# Patient Record
Sex: FEMALE
[De-identification: names, ages, dates, MRNs, and addresses within clinical notes are randomized per-mention and may not be internally consistent; named-entity substitution may affect disease eponyms.]

---

## 2017-11-16 ENCOUNTER — HOSPITAL ENCOUNTER (EMERGENCY)
Dept: HOSPITAL 14 - H.ER | Age: 67
Discharge: HOME | End: 2017-11-16
Payer: MEDICAID

## 2017-11-16 VITALS
RESPIRATION RATE: 18 BRPM | SYSTOLIC BLOOD PRESSURE: 154 MMHG | TEMPERATURE: 98.2 F | DIASTOLIC BLOOD PRESSURE: 76 MMHG | OXYGEN SATURATION: 97 % | HEART RATE: 99 BPM

## 2017-11-16 VITALS — BODY MASS INDEX: 27.3 KG/M2

## 2017-11-16 DIAGNOSIS — M81.0: ICD-10-CM

## 2017-11-16 DIAGNOSIS — E78.00: ICD-10-CM

## 2017-11-16 DIAGNOSIS — F32.9: ICD-10-CM

## 2017-11-16 DIAGNOSIS — M54.9: Primary | ICD-10-CM

## 2017-11-16 DIAGNOSIS — I10: ICD-10-CM

## 2017-11-16 DIAGNOSIS — F41.9: ICD-10-CM

## 2017-11-16 PROCEDURE — 72131 CT LUMBAR SPINE W/O DYE: CPT

## 2017-11-16 PROCEDURE — 99282 EMERGENCY DEPT VISIT SF MDM: CPT

## 2017-11-16 PROCEDURE — 96372 THER/PROPH/DIAG INJ SC/IM: CPT

## 2017-11-16 NOTE — CT
EXAM:

  CT Lumbar Spine Without Intravenous Contrast



CLINICAL HISTORY:

  67 years old, female; Pain; Other: Midline pain; Additional info: Lumbar 

midline pain positional HX of arthritis



TECHNIQUE:

  Axial computed tomography images of the lumbar spine without intravenous 

contrast.  All CT scans at this facility use one or more dose reduction 

techniques, viz.: automated exposure control; ma/kV adjustment per patient size 

(including targeted exams where dose is matched to indication; i.e. head); or 

iterative reconstruction technique.



COMPARISON:

  No relevant prior studies available.



FINDINGS:

  Vertebrae:  No acute fracture.

  Discs/spinal canal/neural foramina:  No acute findings. Degenerative disease 

is identified at the level of L5/S1 with disc space narrowing, endplate changes 

and a vacuum phenomena. Facet arthropathy is also noted.

 Soft tissues:  Unremarkable.



IMPRESSION:     

Degenerative disease at the level of L5/S1, as detailed above.

## 2017-11-16 NOTE — ED PDOC
HPI: Back


Time Seen by Provider: 11/16/17 22:06


Chief Complaint (Nursing): Back Pain


Chief Complaint (Provider): Back Pain


History Per: Patient


History/Exam Limitations: no limitations


Onset/Duration Of Symptoms: Days (x 2), Intermittent Episodes


Additional Complaint(s): 





oRnit is a 68 y/o female who presents to the ED c/o back pain that has been 

intermittent for the past two days. Patient states that the pain is worse when 

walking, bending over, or sitting up, and better when lying still. She denies 

radiation to the legs, numbness, weakness, fever, or urinary symptoms, and does 

not have problems ambulating.





PMD: Kylee Rivas





Past Medical History


Reviewed: Historical Data, Nursing Documentation, Vital Signs


Vital Signs: 


 Last Vital Signs











Temp  98.2 F   11/16/17 22:01


 


Pulse  99 H  11/16/17 22:01


 


Resp  18   11/16/17 22:01


 


BP  154/76 H  11/16/17 22:01


 


Pulse Ox  97   11/16/17 22:01














- Medical History


PMH: Anxiety, Colonic Polyps, Depression, Gastritis, HTN, Hypercholesterolemia, 

Osteoporosis


   Denies: Malignancy, Chronic Kidney Disease





- Surgical History


Surgical History: Endoscopy





- Family History


Family History: States: Unknown Family Hx





- Social History


Drugs: Denies





- Immunization History


Hx Tetanus Toxoid Vaccination: No


Hx Influenza Vaccination: Yes


Hx Pneumococcal Vaccination: No





- Home Medications


Home Medications: 


 Ambulatory Orders











 Medication  Instructions  Recorded


 


Amlodipine Besylate [Amlodipine] 5 mg PO DAILY 05/09/14


 


Atorvastatin Calcium [Lipitor] 10 mg PO DAILY 05/09/14


 


Raloxifene HCl [Evista] 60 mg PO DAILY 02/15/16


 


Ibuprofen [Motrin] 600 mg PO TID #20 tab 08/30/16


 


Methocarbamol [Robaxin] 500 mg PO BID #8 tab 08/30/16


 


Naproxen 500 mg PO BID #30 tab 11/16/17














- Allergies


Allergies/Adverse Reactions: 


 Allergies











Allergy/AdvReac Type Severity Reaction Status Date / Time


 


No Known Allergies Allergy   Verified 08/30/16 14:06














Review of Systems


ROS Statement: Except As Marked, All Systems Reviewed And Found Negative


Constitutional: Negative for: Fever, Weakness


Genitourinary Female: Negative for: Dysuria, Frequency, Incontinence, Hematuria


Musculoskeletal: Positive for: Back Pain.  Negative for: Leg Pain





Physical Exam





- Reviewed


Nursing Documentation Reviewed: Yes


Vital Signs Reviewed: Yes





- Physical Exam


Appears: Positive for: Well, Non-toxic, No Acute Distress


Head Exam: Positive for: ATRAUMATIC, NORMAL INSPECTION, NORMOCEPHALIC


Skin: Positive for: Normal Color, Warm, Dry


ENT: Positive for: Normal ENT Inspection


Neck: Positive for: Normal, Painless ROM, Supple


Cardiovascular/Chest: Positive for: Regular Rate, Rhythm.  Negative for: Murmur


Respiratory: Positive for: Normal Breath Sounds.  Negative for: Respiratory 

Distress


Gastrointestinal/Abdominal: Positive for: Normal Exam, Bowel Sounds, Soft.  

Negative for: Tenderness


Back: Positive for: Other (lower back midline and paraspinal tenderness. 

Negative straight leg)


Extremity: Positive for: Normal ROM.  Negative for: Pedal Edema, Deformity


Neurologic/Psych: Positive for: Alert, Oriented.  Negative for: Motor/Sensory 

Deficits





- ECG


O2 Sat by Pulse Oximetry: 97 (RA)


Pulse Ox Interpretation: Normal





Medical Decision Making


Medical Decision Making: 





Time: 22:15


Initial Impression: Back Pain - Differentials: arthritis of spine, 

musculoskeletal pain, other conditions considered but not listed


Initial Plan:


--CT Lumbar Spine w/o Contrast


--Urine Dipstick


--Toradol


--Flexeril





Time: 23:30


FINDINGS:  


   Vertebrae:  No acute fracture.  


   Discs/spinal canal/neural foramina:  No acute findings. Degenerative disease

   


 is identified at the level of L5/S1 with disc space narrowing, endplate 

changes   


 and a vacuum phenomena. Facet arthropathy is also noted.  


  Soft tissues:  Unremarkable.  


 


 IMPRESSION:       


 Degenerative disease at the level of L5/S1, as detailed above.  





--------------------------------------------------------------------------------

-----------------


Scribe Attestation:


Documented by Zane Saha, acting as a scribe for Dalila Zapien MD





Provider Scribe Attestation:


All medical record entries made by the Scribe were at my direction and 

personally dictated by me. I have reviewed the chart and agree that the record 

accurately reflects my personal performance of the history, physical exam, 

medical decision making, and the department course for this patient. I have 

also personally directed, reviewed, and agree with the discharge instructions 

and disposition.





Disposition





- Clinical Impression


Clinical Impression: 


 Back pain, Arthritis of lumbar spine








- Patient ED Disposition


Is Patient to be Admitted: No


Doctor Will See Patient In The: Office


Counseled Patient/Family Regarding: Studies Performed, Diagnosis, Need For 

Followup





- Disposition


Referrals: 


[facility] [Outside]


Disposition: Routine/Home


Disposition Time: 23:43


Condition: GOOD


Additional Instructions: 


Take your medications as instructed. Follow up with your PCP in 2-3 days. 


Prescriptions: 


Naproxen 500 mg PO BID #30 tab


Instructions:  Back Pain (ED)


Print Language: Maori

## 2018-01-22 ENCOUNTER — HOSPITAL ENCOUNTER (EMERGENCY)
Dept: HOSPITAL 14 - H.ER | Age: 68
Discharge: HOME | End: 2018-01-22
Payer: MEDICAID

## 2018-01-22 VITALS — TEMPERATURE: 97 F | OXYGEN SATURATION: 99 % | RESPIRATION RATE: 17 BRPM

## 2018-01-22 VITALS — SYSTOLIC BLOOD PRESSURE: 120 MMHG | DIASTOLIC BLOOD PRESSURE: 88 MMHG | HEART RATE: 76 BPM

## 2018-01-22 VITALS — BODY MASS INDEX: 27.3 KG/M2

## 2018-01-22 DIAGNOSIS — F41.9: ICD-10-CM

## 2018-01-22 DIAGNOSIS — F32.9: ICD-10-CM

## 2018-01-22 DIAGNOSIS — R73.09: ICD-10-CM

## 2018-01-22 DIAGNOSIS — E78.00: ICD-10-CM

## 2018-01-22 DIAGNOSIS — I10: ICD-10-CM

## 2018-01-22 DIAGNOSIS — M54.9: Primary | ICD-10-CM

## 2018-01-22 DIAGNOSIS — N95.0: ICD-10-CM

## 2018-01-22 DIAGNOSIS — M81.0: ICD-10-CM

## 2018-01-22 LAB
ALBUMIN SERPL-MCNC: 4.5 G/DL (ref 3.5–5)
ALBUMIN/GLOB SERPL: 1.4 {RATIO} (ref 1–2.1)
ALT SERPL-CCNC: 44 U/L (ref 9–52)
AST SERPL-CCNC: 32 U/L (ref 14–36)
BASOPHILS # BLD AUTO: 0.1 K/UL (ref 0–0.2)
BASOPHILS NFR BLD: 1 % (ref 0–2)
BILIRUB UR-MCNC: NEGATIVE MG/DL
BUN SERPL-MCNC: 14 MG/DL (ref 7–17)
CALCIUM SERPL-MCNC: 9.9 MG/DL (ref 8.4–10.2)
COLOR UR: YELLOW
EOSINOPHIL # BLD AUTO: 0 K/UL (ref 0–0.7)
EOSINOPHIL NFR BLD: 0.5 % (ref 0–4)
ERYTHROCYTE [DISTWIDTH] IN BLOOD BY AUTOMATED COUNT: 13.3 % (ref 11.5–14.5)
GFR NON-AFRICAN AMERICAN: > 60
GLUCOSE UR STRIP-MCNC: (no result) MG/DL
HGB BLD-MCNC: 13.7 G/DL (ref 12–16)
LEUKOCYTE ESTERASE UR-ACNC: (no result) LEU/UL
LYMPHOCYTES # BLD AUTO: 2.9 K/UL (ref 1–4.3)
LYMPHOCYTES NFR BLD AUTO: 37 % (ref 20–40)
MCH RBC QN AUTO: 28.7 PG (ref 27–31)
MCHC RBC AUTO-ENTMCNC: 33.3 G/DL (ref 33–37)
MCV RBC AUTO: 86.4 FL (ref 81–99)
MONOCYTES # BLD: 0.5 K/UL (ref 0–0.8)
MONOCYTES NFR BLD: 6.2 % (ref 0–10)
NEUTROPHILS # BLD: 4.3 K/UL (ref 1.8–7)
NEUTROPHILS NFR BLD AUTO: 55.3 % (ref 50–75)
NRBC BLD AUTO-RTO: 0.2 % (ref 0–0)
PH UR STRIP: 8 [PH] (ref 5–8)
PLATELET # BLD: 165 K/UL (ref 130–400)
PMV BLD AUTO: 9.9 FL (ref 7.2–11.7)
PROT UR STRIP-MCNC: NEGATIVE MG/DL
RBC # BLD AUTO: 4.78 MIL/UL (ref 3.8–5.2)
RBC # UR STRIP: NEGATIVE /UL
SP GR UR STRIP: 1.01 (ref 1–1.03)
SQUAMOUS EPITHIAL: < 1 /HPF (ref 0–5)
URINE CLARITY: (no result)
URINE NITRATE: NEGATIVE
UROBILINOGEN UR-MCNC: (no result) MG/DL (ref 0.2–1)
WBC # BLD AUTO: 7.8 K/UL (ref 4.8–10.8)

## 2018-01-22 NOTE — ED PDOC
HPI: Female  Pain


Time Seen by Provider: 01/22/18 12:11


Chief Complaint (Nursing): Female Genitourinary


Chief Complaint (Provider): "my back hurts and i bled yesterday"


History Per: Patient


History/Exam Limitations: no limitations


Onset/Duration Of Symptoms: Days


Current Symptoms Are (Timing): Gone Now


Severity: Mild


Pain Scale Rating Of: 2


Quality Of Discomfort: Aching


Associated Symptoms: Back Pain.  denies: Fever, Nausea, Vomiting, Urinary 

Symptoms


Additional Complaint(s): 





68 y/o pre-diabetic female presents for evaluation of an episode of abnormal 

uterine bleeding and low back pain. Pt reports she had some back pain the last 

few months and reports it worsened yesterday w/o any inciting event. She denies 

any saddle anesthesia, urinary/bowel retention/incontinence. Pain is 3/10, achy

, nonradiating. She also reports an episode of abnormal uterine bleeding, w/o 

any traumatic event. She reports bleeding was about the amount of what her 

periods used to be (2-3 pads per day), w/o clots, and spontaneously subsided. 

LMP was over 5 years ago. No hx of abnormal uterine bleeding. Denies bleeding 

currently. Denies any hx of coagulapathy or blood thinning medications. Reports 

having an endometrial bx years ago w/o any abnormal findings. No other active 

complaints. Denies fever/chills, lightheadedness/dizziness, CP/SOB/palpitations

, N/V/D/C, urinary symptoms, numbness/tingling.  





Past Medical History


Vital Signs: 





 Last Vital Signs











Temp  97 F L  01/22/18 10:27


 


Pulse  69   01/22/18 10:27


 


Resp  17   01/22/18 10:27


 


BP  149/84   01/22/18 10:27


 


Pulse Ox  99   01/22/18 10:27














- Medical History


PMH: Anxiety, Colonic Polyps, Depression, Gastritis, HTN, Hypercholesterolemia, 

Osteoporosis


   Denies: Malignancy, Chronic Kidney Disease





- Surgical History


Surgical History: Endoscopy





- Family History


Family History: States: Unknown Family Hx





- Immunization History


Hx Tetanus Toxoid Vaccination: No


Hx Influenza Vaccination: Yes


Hx Pneumococcal Vaccination: No





- Home Medications


Home Medications: 


 Ambulatory Orders











 Medication  Instructions  Recorded


 


Amlodipine Besylate [Amlodipine] 5 mg PO DAILY 05/09/14


 


Atorvastatin Calcium [Lipitor] 10 mg PO DAILY 05/09/14


 


Raloxifene HCl [Evista] 60 mg PO DAILY 02/15/16


 


Ibuprofen [Motrin] 600 mg PO TID #20 tab 08/30/16


 


Methocarbamol [Robaxin] 500 mg PO BID #8 tab 08/30/16


 


Naproxen 500 mg PO BID #30 tab 11/16/17














- Allergies


Allergies/Adverse Reactions: 


 Allergies











Allergy/AdvReac Type Severity Reaction Status Date / Time


 


No Known Allergies Allergy   Verified 01/22/18 10:27














- Laboratory Results


Result Diagrams: 


 01/22/18 13:47





 01/22/18 13:47





- ECG


O2 Sat by Pulse Oximetry: 99





- Progress


ED Course And Treament: 





CBC


CMP


UA


Type and Screen





labs within normal limits, pt asymptomatic. Declined pelvic examination today. 

Discussed importance of following up with gynecologist and PMD for further work 

up. Pt vocalized understanding and plans to set up appointments this week. ED 

precautions provided. 





Disposition





- Clinical Impression


Clinical Impression: 


 Post-menopausal bleeding








- Patient ED Disposition


Is Patient to be Admitted: No





- Disposition


Disposition: Routine/Home


Disposition Time: 14:37


Condition: STABLE


Additional Instructions: 


follow up with your PMD in 2-3 days


follow up with your gynecologist as soon as possible


if bleeding resumes, please return to ED for evaluation 


Forms:  [employer] (English)

## 2018-08-03 ENCOUNTER — HOSPITAL ENCOUNTER (EMERGENCY)
Dept: HOSPITAL 14 - H.ER | Age: 68
Discharge: HOME | End: 2018-08-03
Payer: MEDICAID

## 2018-08-03 VITALS
OXYGEN SATURATION: 96 % | SYSTOLIC BLOOD PRESSURE: 127 MMHG | DIASTOLIC BLOOD PRESSURE: 77 MMHG | TEMPERATURE: 98.8 F | RESPIRATION RATE: 16 BRPM | HEART RATE: 83 BPM

## 2018-08-03 VITALS — BODY MASS INDEX: 30.2 KG/M2

## 2018-08-03 DIAGNOSIS — E78.00: ICD-10-CM

## 2018-08-03 DIAGNOSIS — R10.31: Primary | ICD-10-CM

## 2018-08-03 DIAGNOSIS — I10: ICD-10-CM

## 2018-08-03 LAB
ALBUMIN SERPL-MCNC: 4.5 G/DL (ref 3.5–5)
ALBUMIN/GLOB SERPL: 1.5 {RATIO} (ref 1–2.1)
ALT SERPL-CCNC: 32 U/L (ref 9–52)
AST SERPL-CCNC: 27 U/L (ref 14–36)
BASOPHILS # BLD AUTO: 0 K/UL (ref 0–0.2)
BASOPHILS NFR BLD: 0.7 % (ref 0–2)
BILIRUB UR-MCNC: NEGATIVE MG/DL
BUN SERPL-MCNC: 19 MG/DL (ref 7–17)
CALCIUM SERPL-MCNC: 9.9 MG/DL (ref 8.4–10.2)
COLOR UR: YELLOW
EOSINOPHIL # BLD AUTO: 0 K/UL (ref 0–0.7)
EOSINOPHIL NFR BLD: 0.7 % (ref 0–4)
ERYTHROCYTE [DISTWIDTH] IN BLOOD BY AUTOMATED COUNT: 13.5 % (ref 11.5–14.5)
GFR NON-AFRICAN AMERICAN: > 60
GLUCOSE UR STRIP-MCNC: (no result) MG/DL
HGB BLD-MCNC: 13.2 G/DL (ref 12–16)
LEUKOCYTE ESTERASE UR-ACNC: (no result) LEU/UL
LIPASE SERPL-CCNC: 63 U/L (ref 23–300)
LYMPHOCYTES # BLD AUTO: 2.8 K/UL (ref 1–4.3)
LYMPHOCYTES NFR BLD AUTO: 41.6 % (ref 20–40)
MCH RBC QN AUTO: 29 PG (ref 27–31)
MCHC RBC AUTO-ENTMCNC: 33.2 G/DL (ref 33–37)
MCV RBC AUTO: 87.4 FL (ref 81–99)
MONOCYTES # BLD: 0.5 K/UL (ref 0–0.8)
MONOCYTES NFR BLD: 7.3 % (ref 0–10)
NEUTROPHILS # BLD: 3.4 K/UL (ref 1.8–7)
NEUTROPHILS NFR BLD AUTO: 49.7 % (ref 50–75)
NRBC BLD AUTO-RTO: 0.1 % (ref 0–0)
PH UR STRIP: 6 [PH] (ref 5–8)
PLATELET # BLD: 159 K/UL (ref 130–400)
PMV BLD AUTO: 10 FL (ref 7.2–11.7)
PROT UR STRIP-MCNC: NEGATIVE MG/DL
RBC # BLD AUTO: 4.57 MIL/UL (ref 3.8–5.2)
RBC # UR STRIP: NEGATIVE /UL
SP GR UR STRIP: 1.01 (ref 1–1.03)
URINE CLARITY: CLEAR
UROBILINOGEN UR-MCNC: (no result) MG/DL (ref 0.2–1)
WBC # BLD AUTO: 6.8 K/UL (ref 4.8–10.8)

## 2018-08-03 PROCEDURE — 85025 COMPLETE CBC W/AUTO DIFF WBC: CPT

## 2018-08-03 PROCEDURE — 74177 CT ABD & PELVIS W/CONTRAST: CPT

## 2018-08-03 PROCEDURE — 93005 ELECTROCARDIOGRAM TRACING: CPT

## 2018-08-03 PROCEDURE — 81003 URINALYSIS AUTO W/O SCOPE: CPT

## 2018-08-03 PROCEDURE — 83690 ASSAY OF LIPASE: CPT

## 2018-08-03 PROCEDURE — 80053 COMPREHEN METABOLIC PANEL: CPT

## 2018-08-03 PROCEDURE — 99283 EMERGENCY DEPT VISIT LOW MDM: CPT

## 2018-08-03 NOTE — CT
Date of service: 



08/03/2018



PROCEDURE:  CT Abdomen and Pelvis with contrast



HISTORY:

R lower abd pain x2 months



COMPARISON:

None.



TECHNIQUE:

Contrast dose: Omnipaque 300, 95 cc



Radiation dose:



Total exam DLP = 621.82 mGy-cm.



This CT exam was performed using one or more of the following dose 

reduction techniques: Automated exposure control, adjustment of the 

mA and/or kV according to patient size, and/or use of iterative 

reconstruction technique.



FINDINGS:



LOWER THORAX:

Unremarkable. 



LIVER:

Diminished attenuation suggests hepatic steatosis. No hepatic mass or 

intrahepatic biliary dilatation identified. 



GALLBLADDER AND BILE DUCTS:

Gallbladder appears mildly distended and is otherwise unremarkable 

appearing. 



PANCREAS:

Unremarkable. No gross lesion or ductal dilatation.



SPLEEN:

Unremarkable. 



ADRENALS:

Unremarkable. No mass. 



KIDNEYS AND URETERS:

No obstructive uropathy or solid parenchymal mass identified 

bilaterally.  Small cortical defects is seen at the right kidney 

suggestive of small infarct. 



VASCULATURE:

Unremarkable. No aortic aneurysm. 



BOWEL:

Unremarkable. No obstruction. No gross mural thickening. 



APPENDIX:

Normal appendix. 



PERITONEUM:

Small umbilical hernia is identified containing only fat. No ascites 

or mesenteric edema. No free intra peritoneal gas identified. 



LYMPH NODES:

Unremarkable. No enlarged lymph nodes. 



BLADDER:

A distended but thin walled urinary bladder is identified. 



REPRODUCTIVE:

Unremarkable. 



BONES:

No acute fracture. 



OTHER FINDINGS:

None.



IMPRESSION:

1. No definite acute abdominal or pelvic findings including the 

appendix. 



2. Mild hepatic steatosis suggested diffusely. 



3. Distended but otherwise unremarkable appearing gallbladder.

## 2018-08-03 NOTE — ED PDOC
- Laboratory Results


Result Diagrams: 


 08/03/18 12:46





 08/03/18 12:46





- ECG


O2 Sat by Pulse Oximetry: 96





Medical Decision Making


Medical Decision Making: 





15:00


-Received patient endorsement by Dr. Moore. Patient with lower abdominal pain 

pending CT scan, reassessment and final ER disposition.


-Lab demonstrate no clinically significant abnormalities





 Accession No. : [id number]


Patient Name / ID : DEEJAY SERNA  / [id number]


Exam Date : 08/03/2018 14:57:48 ( Approved )


Study Comment : 


Sex / Age : F  / 67Y





Creator : Geo Todd MD


Dictator : Geo Tdod MD


 : 


Approver : Geo Todd MD


Approver2 : 





Report Date : 08/03/2018 15:42:56


My Comment : 


********************************************************************************

***





Date of service: 





08/03/2018





PROCEDURE:  CT Abdomen and Pelvis with contrast





HISTORY:


R lower abd pain x2 months





COMPARISON:


None.





TECHNIQUE:


Contrast dose: Omnipaque 300, 95 cc





Radiation dose:





Total exam DLP = 621.82 mGy-cm.





This CT exam was performed using one or more of the following dose reduction 

techniques: Automated exposure control, adjustment of the mA and/or kV 

according to patient size, and/or use of iterative reconstruction technique.





FINDINGS:





LOWER THORAX:


Unremarkable. 





LIVER:


Diminished attenuation suggests hepatic steatosis. No hepatic mass or 

intrahepatic biliary dilatation identified. 





GALLBLADDER AND BILE DUCTS:


Gallbladder appears mildly distended and is otherwise unremarkable appearing. 





PANCREAS:


Unremarkable. No gross lesion or ductal dilatation.





SPLEEN:


Unremarkable. 





ADRENALS:


Unremarkable. No mass. 





KIDNEYS AND URETERS:


No obstructive uropathy or solid parenchymal mass identified bilaterally.  

Small cortical defects is seen at the right kidney suggestive of small infarct. 





VASCULATURE:


Unremarkable. No aortic aneurysm. 





BOWEL:


Unremarkable. No obstruction. No gross mural thickening. 





APPENDIX:


Normal appendix. 





PERITONEUM:


Small umbilical hernia is identified containing only fat. No ascites or 

mesenteric edema. No free intra peritoneal gas identified. 





LYMPH NODES:


Unremarkable. No enlarged lymph nodes. 





BLADDER:


A distended but thin walled urinary bladder is identified. 





REPRODUCTIVE:


Unremarkable. 





BONES:


No acute fracture. 





OTHER FINDINGS:


None.





IMPRESSION:


1. No definite acute abdominal or pelvic findings including the appendix. 





2. Mild hepatic steatosis suggested diffusely. 





3. Distended but otherwise unremarkable appearing gallbladder.





 On reevaluation pt stable. DW pt findings and plan of care. Bentyl prn. PMD 

and possible GI follow up. All questions/concerns answered/addressed.











Disposition





- Clinical Impression


Clinical Impression: 


 Abdominal pain








- POA


Present On Arrival: None





- Disposition


Referrals: 


Tristen Yi [Staff Provider] -  (CALL IMMEDIATELY TO ARRANGE FOLLOW UP 

APPOINTMENT IN 1-2 WEEKS. YOU MAY NEED TO SEE YOUR PCP FIRST TO OBTAIN A 

REFERRAL.)


Disposition: Routine/Home


Disposition Time: 15:30


Condition: STABLE


Prescriptions: 


Dicyclomine [Bentyl] 20 mg PO QID PRN #20 tab


 PRN Reason: abdominal pain


Saccharomyces Boulardi [Florastor] 500 mg PO BID #28 cap


Instructions:  Stomach Ache and Stomach Upset


Print Language: Serbian

## 2018-08-03 NOTE — ED PDOC
HPI: Abdomen


Time Seen by Provider: 18 11:40


Chief Complaint (Nursing): Abdominal Pain


Chief Complaint (Provider): abdominal pain


History Per: Patient, 


History/Exam Limitations: no limitations


Current Symptoms Are (Timing): Still Present


Location Of Pain/Discomfort: RLQ, Periumbilical, Suprapubic


Quality Of Discomfort: Sharp


Associated Symptoms: Urinary Symptoms (frequency).  denies: Nausea, Vomiting, 

Diarrhea, Loss Of Appetite


Additional Complaint(s): 





66yo female c/o lower abdominal pain ongoing intermittently for about 2 months. 

Denies vomiting, diarrhea, constipation, fever or dizziness. Does note urinary 

frequency but denies dysuria or hematuria. Denies vaginal complaints. Told PMD 

at [facility] about symptoms and had bloodwork performed but unknown 

results.  Today pain was worse prompting ED visit. 





Past Medical History


Reviewed: Historical Data, Nursing Documentation, Vital Signs


Vital Signs: 





 Last Vital Signs











Temp  98.8 F   18 11:36


 


Pulse  83   18 11:36


 


Resp  16   18 11:36


 


BP  127/77   18 11:36


 


Pulse Ox  96   18 11:36














- Medical History


PMH: Anxiety, Colonic Polyps, Depression, Gastritis, HTN, Hypercholesterolemia, 

Osteoporosis


   Denies: Malignancy, Chronic Kidney Disease





- Surgical History


Surgical History: Endoscopy, 





- Family History


Family History: States: Unknown Family Hx





- Living Arrangements


Living Arrangements: With Family





- Social History


Current smoker - smoking cessation education provided: No


Alcohol: None





- Immunization History


Hx Tetanus Toxoid Vaccination: No


Hx Influenza Vaccination: Yes


Hx Pneumococcal Vaccination: No





- Home Medications


Home Medications: 


 Ambulatory Orders











 Medication  Instructions  Recorded


 


Amlodipine Besylate [Amlodipine] 5 mg PO DAILY 14


 


Atorvastatin Calcium [Lipitor] 10 mg PO DAILY 14


 


Raloxifene HCl [Evista] 60 mg PO DAILY 02/15/16


 


Ibuprofen [Motrin] 600 mg PO TID #20 tab 16


 


Methocarbamol [Robaxin] 500 mg PO BID #8 tab 16


 


Naproxen 500 mg PO BID #30 tab 17














- Allergies


Allergies/Adverse Reactions: 


 Allergies











Allergy/AdvReac Type Severity Reaction Status Date / Time


 


No Known Allergies Allergy   Verified 18 10:27














Review of Systems


Constitutional: Negative for: Fever


ENT: Negative for: Ear Pain


Cardiovascular: Negative for: Chest Pain


Respiratory: Negative for: Shortness of Breath


Gastrointestinal: Positive for: Abdominal Pain.  Negative for: Vomiting, 

Diarrhea


Genitourinary Female: Positive for: Frequency.  Negative for: Dysuria, 

Incontinence, Hematuria


Musculoskeletal: Negative for: Neck Pain


Skin: Negative for: Rash, Lesions


Neurological: Negative for: Weakness, Numbness





Physical Exam





- Reviewed


Nursing Documentation Reviewed: Yes


Vital Signs Reviewed: Yes





- Physical Exam


Appears: Positive for: Well, Non-toxic, No Acute Distress


Head Exam: Positive for: ATRAUMATIC, NORMAL INSPECTION, NORMOCEPHALIC


Skin: Positive for: Normal Color, Warm.  Negative for: Rash (neg rash to abdomen

)


Eye Exam: Positive for: EOMI, Normal appearance, PERRL


ENT: Positive for: Normal ENT Inspection


Neck: Positive for: Normal, Painless ROM


Cardiovascular/Chest: Positive for: Regular Rate, Rhythm


Respiratory: Positive for: CNT, Normal Breath Sounds


Gastrointestinal/Abdominal: Positive for: Soft, Tenderness (mild RLQ tenderness)

.  Negative for: Guarding, Rebound


Back: Positive for: Normal Inspection


Extremity: Positive for: Normal ROM.  Negative for: Tenderness


Neurologic/Psych: Positive for: Alert, Oriented.  Negative for: Motor/Sensory 

Deficits





- Laboratory Results


Result Diagrams: 


 18 12:46





 18 12:46





- ECG


O2 Sat by Pulse Oximetry: 96





Medical Decision Making


Medical Decision Making: 





workup for ongoing intermittent abd pain initiated


labs and CT abd pelvis initiated








---------


labs reviewed and clinically unremarkable








Disposition





- Clinical Impression


Clinical Impression: 


 Abdominal pain








- Patient ED Disposition


Is Patient to be Admitted: No


Counseled Patient/Family Regarding: Studies Performed, Diagnosis, Need For 

Followup





- Disposition


Disposition: Transfer of Care


Disposition Time: 14:49


Condition: STABLE


Patient Signed Over To: Rose Zamudio


Handoff Comments: pending CT /re-eval and dispo/dx

## 2018-08-03 NOTE — CARD
--------------- APPROVED REPORT --------------





Date of service: 08/03/2018



<Conclusion>

Normal sinus rhythm

Normal ECG

## 2018-10-03 ENCOUNTER — HOSPITAL ENCOUNTER (EMERGENCY)
Dept: HOSPITAL 14 - H.ER | Age: 68
Discharge: HOME | End: 2018-10-03
Payer: MEDICAID

## 2018-10-03 VITALS — OXYGEN SATURATION: 97 % | RESPIRATION RATE: 20 BRPM

## 2018-10-03 VITALS — DIASTOLIC BLOOD PRESSURE: 86 MMHG | HEART RATE: 75 BPM | SYSTOLIC BLOOD PRESSURE: 134 MMHG

## 2018-10-03 VITALS — TEMPERATURE: 97 F

## 2018-10-03 VITALS — BODY MASS INDEX: 27.2 KG/M2

## 2018-10-03 DIAGNOSIS — R42: Primary | ICD-10-CM

## 2018-10-03 DIAGNOSIS — I10: ICD-10-CM

## 2018-10-03 DIAGNOSIS — E78.00: ICD-10-CM

## 2018-10-03 LAB
ALBUMIN SERPL-MCNC: 4.3 G/DL (ref 3.5–5)
ALBUMIN/GLOB SERPL: 1.3 {RATIO} (ref 1–2.1)
ALT SERPL-CCNC: 31 U/L (ref 9–52)
AST SERPL-CCNC: 26 U/L (ref 14–36)
BASOPHILS # BLD AUTO: 0 K/UL (ref 0–0.2)
BASOPHILS NFR BLD: 0.8 % (ref 0–2)
BUN SERPL-MCNC: 15 MG/DL (ref 7–17)
CALCIUM SERPL-MCNC: 10 MG/DL (ref 8.4–10.2)
EOSINOPHIL # BLD AUTO: 0 K/UL (ref 0–0.7)
EOSINOPHIL NFR BLD: 0.5 % (ref 0–4)
ERYTHROCYTE [DISTWIDTH] IN BLOOD BY AUTOMATED COUNT: 13.7 % (ref 11.5–14.5)
GFR NON-AFRICAN AMERICAN: > 60
HGB BLD-MCNC: 14.2 G/DL (ref 12–16)
LYMPHOCYTES # BLD AUTO: 1.9 K/UL (ref 1–4.3)
LYMPHOCYTES NFR BLD AUTO: 32.6 % (ref 20–40)
MCH RBC QN AUTO: 29.9 PG (ref 27–31)
MCHC RBC AUTO-ENTMCNC: 34.2 G/DL (ref 33–37)
MCV RBC AUTO: 87.4 FL (ref 81–99)
MONOCYTES # BLD: 0.4 K/UL (ref 0–0.8)
MONOCYTES NFR BLD: 6.4 % (ref 0–10)
NEUTROPHILS # BLD: 3.5 K/UL (ref 1.8–7)
NEUTROPHILS NFR BLD AUTO: 59.7 % (ref 50–75)
NRBC BLD AUTO-RTO: 0.2 % (ref 0–0)
PLATELET # BLD: 168 K/UL (ref 130–400)
PMV BLD AUTO: 9.5 FL (ref 7.2–11.7)
RBC # BLD AUTO: 4.74 MIL/UL (ref 3.8–5.2)
WBC # BLD AUTO: 5.9 K/UL (ref 4.8–10.8)

## 2018-10-03 NOTE — CT
Date of service: 



10/03/2018



PROCEDURE:  CT HEAD WITHOUT CONTRAST.



HISTORY:

headache



COMPARISON:

None available.



TECHNIQUE:

Axial computed tomography images were obtained through the head/brain 

without intravenous contrast.  



Radiation dose:



Total exam DLP = 670 mGy-cm.



This CT exam was performed using one or more of the following dose 

reduction techniques: Automated exposure control, adjustment of the 

mA and/or kV according to patient size, and/or use of iterative 

reconstruction technique.



FINDINGS:



HEMORRHAGE:

No intracranial hemorrhage. 



BRAIN:

No mass effect or edema.  There is mild generalized cerebral atrophy 

and mild prominence of the frontal extra axial spaces and cerebral 

sulci.  No particularly prominent microvascular ischemic changes are 

noted..



VENTRICLES:

Unremarkable. No hydrocephalus. 



CALVARIUM:

Unremarkable.



PARANASAL SINUSES:

Unremarkable as visualized. No significant inflammatory changes.



MASTOID AIR CELLS:

Unremarkable as visualized. No inflammatory changes.



OTHER FINDINGS:

None.



IMPRESSION:

Mild generalized cerebral atrophy.  Otherwise unremarkable exam



No hemorrhage or mass effect.